# Patient Record
Sex: MALE | Race: WHITE | URBAN - METROPOLITAN AREA
[De-identification: names, ages, dates, MRNs, and addresses within clinical notes are randomized per-mention and may not be internally consistent; named-entity substitution may affect disease eponyms.]

---
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
## 2022-01-17 ENCOUNTER — OUTPATIENT (OUTPATIENT)
Dept: OUTPATIENT SERVICES | Facility: HOSPITAL | Age: 22
LOS: 1 days | End: 2022-01-17
Payer: COMMERCIAL

## 2022-01-17 ENCOUNTER — APPOINTMENT (OUTPATIENT)
Dept: RADIOLOGY | Facility: CLINIC | Age: 22
End: 2022-01-17
Payer: COMMERCIAL

## 2022-01-17 DIAGNOSIS — J45.21 MILD INTERMITTENT ASTHMA WITH (ACUTE) EXACERBATION: ICD-10-CM

## 2022-01-17 DIAGNOSIS — J34.2 DEVIATED NASAL SEPTUM: ICD-10-CM

## 2022-01-17 PROCEDURE — 71046 X-RAY EXAM CHEST 2 VIEWS: CPT

## 2022-01-17 PROCEDURE — 71046 X-RAY EXAM CHEST 2 VIEWS: CPT | Mod: 26

## 2022-01-18 ENCOUNTER — OUTPATIENT (OUTPATIENT)
Dept: OUTPATIENT SERVICES | Facility: HOSPITAL | Age: 22
LOS: 1 days | End: 2022-01-18
Payer: COMMERCIAL

## 2022-01-18 VITALS
HEIGHT: 70 IN | TEMPERATURE: 98 F | RESPIRATION RATE: 18 BRPM | WEIGHT: 250 LBS | HEART RATE: 77 BPM | OXYGEN SATURATION: 100 % | DIASTOLIC BLOOD PRESSURE: 67 MMHG | SYSTOLIC BLOOD PRESSURE: 129 MMHG

## 2022-01-18 DIAGNOSIS — Z01.818 ENCOUNTER FOR OTHER PREPROCEDURAL EXAMINATION: ICD-10-CM

## 2022-01-18 DIAGNOSIS — S09.92XA UNSPECIFIED INJURY OF NOSE, INITIAL ENCOUNTER: ICD-10-CM

## 2022-01-18 LAB
ANION GAP SERPL CALC-SCNC: 4 MMOL/L — LOW (ref 5–17)
BASOPHILS # BLD AUTO: 0.04 K/UL — SIGNIFICANT CHANGE UP (ref 0–0.2)
BASOPHILS NFR BLD AUTO: 0.6 % — SIGNIFICANT CHANGE UP (ref 0–2)
BUN SERPL-MCNC: 16 MG/DL — SIGNIFICANT CHANGE UP (ref 7–23)
CALCIUM SERPL-MCNC: 9.1 MG/DL — SIGNIFICANT CHANGE UP (ref 8.5–10.1)
CHLORIDE SERPL-SCNC: 108 MMOL/L — SIGNIFICANT CHANGE UP (ref 96–108)
CO2 SERPL-SCNC: 26 MMOL/L — SIGNIFICANT CHANGE UP (ref 22–31)
CREAT SERPL-MCNC: 0.79 MG/DL — SIGNIFICANT CHANGE UP (ref 0.5–1.3)
EOSINOPHIL # BLD AUTO: 0.27 K/UL — SIGNIFICANT CHANGE UP (ref 0–0.5)
EOSINOPHIL NFR BLD AUTO: 4 % — SIGNIFICANT CHANGE UP (ref 0–6)
GLUCOSE SERPL-MCNC: 86 MG/DL — SIGNIFICANT CHANGE UP (ref 70–99)
HCT VFR BLD CALC: 43.2 % — SIGNIFICANT CHANGE UP (ref 39–50)
HGB BLD-MCNC: 15 G/DL — SIGNIFICANT CHANGE UP (ref 13–17)
IMM GRANULOCYTES NFR BLD AUTO: 0.3 % — SIGNIFICANT CHANGE UP (ref 0–1.5)
LYMPHOCYTES # BLD AUTO: 2.16 K/UL — SIGNIFICANT CHANGE UP (ref 1–3.3)
LYMPHOCYTES # BLD AUTO: 31.6 % — SIGNIFICANT CHANGE UP (ref 13–44)
MCHC RBC-ENTMCNC: 28.8 PG — SIGNIFICANT CHANGE UP (ref 27–34)
MCHC RBC-ENTMCNC: 34.7 GM/DL — SIGNIFICANT CHANGE UP (ref 32–36)
MCV RBC AUTO: 82.9 FL — SIGNIFICANT CHANGE UP (ref 80–100)
MONOCYTES # BLD AUTO: 0.63 K/UL — SIGNIFICANT CHANGE UP (ref 0–0.9)
MONOCYTES NFR BLD AUTO: 9.2 % — SIGNIFICANT CHANGE UP (ref 2–14)
NEUTROPHILS # BLD AUTO: 3.71 K/UL — SIGNIFICANT CHANGE UP (ref 1.8–7.4)
NEUTROPHILS NFR BLD AUTO: 54.3 % — SIGNIFICANT CHANGE UP (ref 43–77)
PLATELET # BLD AUTO: 324 K/UL — SIGNIFICANT CHANGE UP (ref 150–400)
POTASSIUM SERPL-MCNC: 4 MMOL/L — SIGNIFICANT CHANGE UP (ref 3.5–5.3)
POTASSIUM SERPL-SCNC: 4 MMOL/L — SIGNIFICANT CHANGE UP (ref 3.5–5.3)
RBC # BLD: 5.21 M/UL — SIGNIFICANT CHANGE UP (ref 4.2–5.8)
RBC # FLD: 12.3 % — SIGNIFICANT CHANGE UP (ref 10.3–14.5)
SODIUM SERPL-SCNC: 138 MMOL/L — SIGNIFICANT CHANGE UP (ref 135–145)
WBC # BLD: 6.83 K/UL — SIGNIFICANT CHANGE UP (ref 3.8–10.5)
WBC # FLD AUTO: 6.83 K/UL — SIGNIFICANT CHANGE UP (ref 3.8–10.5)

## 2022-01-18 PROCEDURE — 36415 COLL VENOUS BLD VENIPUNCTURE: CPT

## 2022-01-18 PROCEDURE — 80048 BASIC METABOLIC PNL TOTAL CA: CPT

## 2022-01-18 PROCEDURE — 85025 COMPLETE CBC W/AUTO DIFF WBC: CPT

## 2022-01-18 PROCEDURE — G0463: CPT | Mod: 25

## 2022-01-18 RX ORDER — SODIUM CHLORIDE 9 MG/ML
1000 INJECTION, SOLUTION INTRAVENOUS
Refills: 0 | Status: DISCONTINUED | OUTPATIENT
Start: 2022-01-19 | End: 2022-01-19

## 2022-01-18 RX ORDER — FENTANYL CITRATE 50 UG/ML
50 INJECTION INTRAVENOUS
Refills: 0 | Status: DISCONTINUED | OUTPATIENT
Start: 2022-01-19 | End: 2022-01-19

## 2022-01-18 RX ORDER — ONDANSETRON 8 MG/1
4 TABLET, FILM COATED ORAL EVERY 6 HOURS
Refills: 0 | Status: DISCONTINUED | OUTPATIENT
Start: 2022-01-19 | End: 2022-01-19

## 2022-01-18 NOTE — H&P PST ADULT - HISTORY OF PRESENT ILLNESS
22 y/o male with  22 y/o male with complain of nasal congestion. Pt reports old injury to his nose, "I fell and broke my nose last year." He is here for PST for planned Nasal septoplasty.

## 2022-01-18 NOTE — H&P PST ADULT - ASSESSMENT
Plan  1. Stop all NSAIDS, herbal supplements and vitamins for 7 days.  2. NPO  as per ASU instructions.  3. CIOVID test done 01/16/2022 in [facility]   4. Labs as per surgeon.t  5. PMD visit for optimization prior to surgery as per surgeon         20 y/o male with complain of nasal congestion. Pt reports old injury to his nose, "I fell and broke my nose last year." He is scheduled for Nasal septoplasty.   Plan  1. Stop all NSAIDS, herbal supplements and vitamins for 7 days.  2. NPO  as per ASU instructions.  3. COVID test done 01/16/2022 in [facility]   4. Labs as per surgeon  5. PMD visit for optimization prior to surgery as per surgeon

## 2022-01-18 NOTE — ASU PATIENT PROFILE, ADULT - NSICDXPASTMEDICALHX_GEN_ALL_CORE_FT
PAST MEDICAL HISTORY:  Anxiety and depression     Asthma as a child    At risk for sleep apnea     History of nose injury     Obesity     Post traumatic stress disorder (PTSD) childhood

## 2022-01-18 NOTE — H&P PST ADULT - NSICDXPASTMEDICALHX_GEN_ALL_CORE_FT
PAST MEDICAL HISTORY:  Anxiety and depression     Asthma as a child    At risk for sleep apnea     Nasal bone fracture     Obesity     Post traumatic stress disorder (PTSD) childhood     PAST MEDICAL HISTORY:  Anxiety and depression     Asthma as a child    At risk for sleep apnea     History of nose injury     Obesity     Post traumatic stress disorder (PTSD) childhood

## 2022-01-18 NOTE — ASU PATIENT PROFILE, ADULT - FALL HARM RISK - UNIVERSAL INTERVENTIONS
Bed in lowest position, wheels locked, appropriate side rails in place/Call bell, personal items and telephone in reach/Instruct patient to call for assistance before getting out of bed or chair/Non-slip footwear when patient is out of bed/[place] to call system/Physically safe environment - no spills, clutter or unnecessary equipment/Purposeful Proactive Rounding/Room/bathroom lighting operational, light cord in reach

## 2022-01-18 NOTE — ASU PATIENT PROFILE, ADULT - NS PRO ABUSE SCREEN AFRAID ANYONE YN
-- DO NOT REPLY / DO NOT REPLY ALL --  -- Message is from the [facility]--    Patient is requesting a medication refill - medication is on active medication list    Patient is currently OUT of the requested medication.    Was Medication Pended?  No.     Rx Name and Dose:   atorvastatin (LIPITOR) 80 MG tablet                                        metoPROLOL succinate (TOPROL-XL) 25 MG 24 hr tablet    Duration: 90 days    Pharmacy  [id number] [place] [address] [facility]    Patient confirmed the above pharmacy as correct?  Yes    Caller Information       Type Contact Phone    07/20/2020 04:13 PM Phone (Incoming) Bessy Aragon (Emergency Contact) [phone]          Alternative phone number:      Turnaround time given to caller:   \"This message will be sent to [state Provider's name]. The clinical team will fulfill your request as soon as they review your message.\"   no

## 2022-01-18 NOTE — H&P PST ADULT - NSICDXFAMILYHX_GEN_ALL_CORE_FT
FAMILY HISTORY:  Father  Still living? Unknown  Family history of prostate cancer in father, Age at diagnosis: Age Unknown    Sibling  Still living? Unknown  Family history of liver disease, Age at diagnosis: Age Unknown

## 2022-01-19 ENCOUNTER — OUTPATIENT (OUTPATIENT)
Dept: INPATIENT UNIT | Facility: HOSPITAL | Age: 22
LOS: 1 days | Discharge: HOME | End: 2022-01-19
Payer: COMMERCIAL

## 2022-01-19 VITALS
TEMPERATURE: 97 F | HEART RATE: 88 BPM | DIASTOLIC BLOOD PRESSURE: 81 MMHG | SYSTOLIC BLOOD PRESSURE: 138 MMHG | OXYGEN SATURATION: 97 % | RESPIRATION RATE: 18 BRPM

## 2022-01-19 VITALS
HEIGHT: 70 IN | WEIGHT: 250 LBS | HEART RATE: 95 BPM | OXYGEN SATURATION: 97 % | RESPIRATION RATE: 16 BRPM | TEMPERATURE: 98 F | DIASTOLIC BLOOD PRESSURE: 71 MMHG | SYSTOLIC BLOOD PRESSURE: 130 MMHG

## 2022-01-19 DIAGNOSIS — Z01.818 ENCOUNTER FOR OTHER PREPROCEDURAL EXAMINATION: ICD-10-CM

## 2022-01-19 DIAGNOSIS — S09.92XA UNSPECIFIED INJURY OF NOSE, INITIAL ENCOUNTER: ICD-10-CM

## 2022-01-19 PROCEDURE — 30465 REPAIR NASAL STENOSIS: CPT | Mod: AS

## 2022-01-19 RX ORDER — ACETAMINOPHEN 500 MG
1000 TABLET ORAL ONCE
Refills: 0 | Status: COMPLETED | OUTPATIENT
Start: 2022-01-19 | End: 2022-01-19

## 2022-01-19 RX ORDER — OXYCODONE HYDROCHLORIDE 5 MG/1
5 TABLET ORAL ONCE
Refills: 0 | Status: DISCONTINUED | OUTPATIENT
Start: 2022-01-19 | End: 2022-01-19

## 2022-01-19 RX ADMIN — FENTANYL CITRATE 50 MICROGRAM(S): 50 INJECTION INTRAVENOUS at 11:20

## 2022-01-19 RX ADMIN — OXYCODONE HYDROCHLORIDE 5 MILLIGRAM(S): 5 TABLET ORAL at 12:02

## 2022-01-19 RX ADMIN — Medication 400 MILLIGRAM(S): at 11:20

## 2022-01-19 NOTE — ASU DISCHARGE PLAN (ADULT/PEDIATRIC) - CARE PROVIDER_API CALL
Reynold Brown (MD)  Plastic Surgery; Surgery of the Hand  [address], [address]  [place] [address]  Phone: [phone]  Fax: [phone]  Follow Up Time:

## 2022-01-19 NOTE — ASU DISCHARGE PLAN (ADULT/PEDIATRIC) - ASU DC SPECIAL INSTRUCTIONSFT
Please take pain medications as already prescribed by Dr. Brown.  Leave steri strips and splint in place until seen in the office.

## 2022-01-19 NOTE — ASU DISCHARGE PLAN (ADULT/PEDIATRIC) - NS MD DC FALL RISK RISK
For information on Fall & Injury Prevention, visit: [email or web address] OR  [email or web address] OR  https://www.cdc.gov/steadi/patient.html

## 2022-01-24 DIAGNOSIS — Z87.891 PERSONAL HISTORY OF NICOTINE DEPENDENCE: ICD-10-CM

## 2022-01-24 DIAGNOSIS — J34.89 OTHER SPECIFIED DISORDERS OF NOSE AND NASAL SINUSES: ICD-10-CM

## 2022-01-24 DIAGNOSIS — E66.9 OBESITY, UNSPECIFIED: ICD-10-CM

## 2022-01-24 DIAGNOSIS — F43.10 POST-TRAUMATIC STRESS DISORDER, UNSPECIFIED: ICD-10-CM

## 2022-01-24 DIAGNOSIS — Z80.42 FAMILY HISTORY OF MALIGNANT NEOPLASM OF PROSTATE: ICD-10-CM

## 2022-01-24 DIAGNOSIS — J34.2 DEVIATED NASAL SEPTUM: ICD-10-CM

## 2022-01-24 DIAGNOSIS — J34.3 HYPERTROPHY OF NASAL TURBINATES: ICD-10-CM

## 2022-01-24 DIAGNOSIS — Z87.828 PERSONAL HISTORY OF OTHER (HEALED) PHYSICAL INJURY AND TRAUMA: ICD-10-CM

## 2022-07-14 PROBLEM — Z00.00 ENCOUNTER FOR PREVENTIVE HEALTH EXAMINATION: Status: ACTIVE | Noted: 2022-07-14

## 2023-11-15 ENCOUNTER — OFFICE (OUTPATIENT)
Dept: URBAN - METROPOLITAN AREA CLINIC 113 | Facility: CLINIC | Age: 23
Setting detail: OPHTHALMOLOGY
End: 2023-11-15
Payer: COMMERCIAL

## 2023-11-15 DIAGNOSIS — H52.13: ICD-10-CM

## 2023-11-15 PROCEDURE — SCREF LASIK EVAL: Performed by: OPHTHALMOLOGY

## 2023-11-15 ASSESSMENT — REFRACTION_MANIFEST
OD_VA1: 20/20
OD_AXIS: 020
OD_VA1: 20/20
OD_VA1: 20/20
OD_CYLINDER: -3.75
OS_AXIS: 160
OS_CYLINDER: -3.25
OS_AXIS: 160
OD_SPHERE: -0.50
OS_CYLINDER: -2.50
OS_CYLINDER: -3.50
OS_SPHERE: PLANO
OS_SPHERE: +0.25
OS_AXIS: 160
OD_AXIS: 025
OD_CYLINDER: -3.50
OS_VA1: 20/20
OS_SPHERE: +0.25
OD_CYLINDER: -3.25
OD_AXIS: 025
OD_SPHERE: -0.50
OD_SPHERE: -0.25
OS_VA1: 20/20
OS_VA1: 20/20

## 2023-11-15 ASSESSMENT — REFRACTION_AUTOREFRACTION
OD_AXIS: 024
OS_AXIS: 163
OS_CYLINDER: -3.50
OS_SPHERE: +0.25
OD_CYLINDER: -3.50
OD_SPHERE: -0.75

## 2023-11-15 ASSESSMENT — LID EXAM ASSESSMENTS
OS_BLEPHARITIS: LUL T
OD_BLEPHARITIS: RUL T

## 2023-11-15 ASSESSMENT — REFRACTION_CURRENTRX
OS_AXIS: 159
OS_OVR_VA: 20/
OD_CYLINDER: -3.25
OS_CYLINDER: -2.50
OD_AXIS: 021
OS_VPRISM_DIRECTION: SV
OS_SPHERE: +0.25
OD_OVR_VA: 20/
OD_VPRISM_DIRECTION: SV
OD_SPHERE: -0.50

## 2023-11-15 ASSESSMENT — SPHEQUIV_DERIVED
OD_SPHEQUIV: -2.125
OD_SPHEQUIV: -2.5
OD_SPHEQUIV: -2.125
OS_SPHEQUIV: -1
OS_SPHEQUIV: -1.5
OD_SPHEQUIV: -2.25
OS_SPHEQUIV: -1.375

## 2023-11-15 ASSESSMENT — DECREASING TEAR LAKE - SEVERITY SCORE
OD_DEC_TEARLAKE: T
OS_DEC_TEARLAKE: T

## 2023-11-15 ASSESSMENT — CONFRONTATIONAL VISUAL FIELD TEST (CVF)
OD_FINDINGS: FULL
OS_FINDINGS: FULL

## 2023-12-01 ENCOUNTER — OFFICE (OUTPATIENT)
Dept: URBAN - METROPOLITAN AREA CLINIC 94 | Facility: CLINIC | Age: 23
Setting detail: OPHTHALMOLOGY
End: 2023-12-01
Payer: COMMERCIAL

## 2023-12-01 DIAGNOSIS — H17.823: ICD-10-CM

## 2023-12-01 PROCEDURE — 99024 POSTOP FOLLOW-UP VISIT: CPT | Performed by: PHYSICIAN ASSISTANT

## 2023-12-01 ASSESSMENT — REFRACTION_CURRENTRX
OS_SPHERE: +0.25
OD_VPRISM_DIRECTION: SV
OD_OVR_VA: 20/
OS_VPRISM_DIRECTION: SV
OS_AXIS: 159
OD_AXIS: 021
OS_CYLINDER: -2.50
OD_SPHERE: -0.50
OS_OVR_VA: 20/
OD_CYLINDER: -3.25

## 2023-12-01 ASSESSMENT — REFRACTION_AUTOREFRACTION
OD_CYLINDER: -0.25
OD_SPHERE: 0.00
OS_SPHERE: +0.25
OS_CYLINDER: -0.25
OS_AXIS: 120
OD_AXIS: 114

## 2023-12-01 ASSESSMENT — REFRACTION_MANIFEST
OD_CYLINDER: -3.75
OD_AXIS: 025
OS_AXIS: 160
OS_VA1: 20/20
OS_SPHERE: PLANO
OS_CYLINDER: -3.50
OS_VA1: 20/20
OD_VA1: 20/20
OS_AXIS: 160
OS_CYLINDER: -3.25
OS_SPHERE: +0.25
OS_CYLINDER: -2.50
OD_CYLINDER: -3.25
OS_VA1: 20/20
OS_AXIS: 160
OS_SPHERE: +0.25
OD_VA1: 20/20
OD_AXIS: 025
OD_VA1: 20/20
OD_SPHERE: -0.50
OD_SPHERE: -0.50
OD_CYLINDER: -3.50
OD_AXIS: 020
OD_SPHERE: -0.25

## 2023-12-01 ASSESSMENT — CONFRONTATIONAL VISUAL FIELD TEST (CVF)
OD_FINDINGS: FULL
OS_FINDINGS: FULL

## 2023-12-01 ASSESSMENT — DECREASING TEAR LAKE - SEVERITY SCORE
OD_DEC_TEARLAKE: T
OS_DEC_TEARLAKE: T

## 2023-12-01 ASSESSMENT — LID EXAM ASSESSMENTS
OS_BLEPHARITIS: LUL T
OD_BLEPHARITIS: RUL T

## 2023-12-01 ASSESSMENT — SPHEQUIV_DERIVED
OS_SPHEQUIV: 0.125
OS_SPHEQUIV: -1.375
OD_SPHEQUIV: -2.125
OS_SPHEQUIV: -1
OD_SPHEQUIV: -2.125
OD_SPHEQUIV: -2.25
OD_SPHEQUIV: -0.125

## 2023-12-06 ENCOUNTER — OFFICE (OUTPATIENT)
Dept: URBAN - METROPOLITAN AREA CLINIC 113 | Facility: CLINIC | Age: 23
Setting detail: OPHTHALMOLOGY
End: 2023-12-06
Payer: COMMERCIAL

## 2023-12-06 ENCOUNTER — RX ONLY (RX ONLY)
Age: 23
End: 2023-12-06

## 2023-12-06 DIAGNOSIS — H16.223: ICD-10-CM

## 2023-12-06 DIAGNOSIS — H17.823: ICD-10-CM

## 2023-12-06 PROCEDURE — 68761 CLOSE TEAR DUCT OPENING: CPT | Mod: 50 | Performed by: OPHTHALMOLOGY

## 2023-12-06 PROCEDURE — 92012 INTRM OPH EXAM EST PATIENT: CPT | Mod: 25 | Performed by: OPHTHALMOLOGY

## 2023-12-06 ASSESSMENT — SUPERFICIAL PUNCTATE KERATITIS (SPK)
OD_SPK: 2+
OS_SPK: 1+

## 2023-12-06 ASSESSMENT — REFRACTION_AUTOREFRACTION
OD_AXIS: 167
OD_SPHERE: PLANO
OD_CYLINDER: -0.25
OS_SPHERE: +1.25
OS_AXIS: 069
OS_CYLINDER: -0.25

## 2023-12-06 ASSESSMENT — SPHEQUIV_DERIVED
OD_SPHEQUIV: -2.125
OD_SPHEQUIV: -2.25
OS_SPHEQUIV: -1
OS_SPHEQUIV: -1.375
OD_SPHEQUIV: -2.125
OS_SPHEQUIV: 1.125

## 2023-12-06 ASSESSMENT — REFRACTION_MANIFEST
OD_AXIS: 020
OD_VA1: 20/20
OS_VA1: 20/20
OS_VA1: 20/20
OD_AXIS: 025
OS_SPHERE: PLANO
OS_VA1: 20/20
OD_VA1: 20/20
OD_VA1: 20/20
OS_SPHERE: +0.25
OS_SPHERE: +0.25
OD_SPHERE: -0.50
OS_CYLINDER: -2.50
OD_CYLINDER: -3.25
OS_AXIS: 160
OD_SPHERE: -0.50
OS_AXIS: 160
OD_CYLINDER: -3.75
OS_AXIS: 160
OD_SPHERE: -0.25
OD_CYLINDER: -3.50
OS_CYLINDER: -3.50
OD_AXIS: 025
OS_CYLINDER: -3.25

## 2023-12-06 ASSESSMENT — REFRACTION_CURRENTRX
OD_CYLINDER: -3.25
OD_AXIS: 021
OS_OVR_VA: 20/
OS_CYLINDER: -2.50
OS_VPRISM_DIRECTION: SV
OD_SPHERE: -0.50
OS_AXIS: 159
OS_SPHERE: +0.25
OD_VPRISM_DIRECTION: SV
OD_OVR_VA: 20/

## 2023-12-06 ASSESSMENT — LID EXAM ASSESSMENTS
OD_BLEPHARITIS: RUL T
OS_BLEPHARITIS: LUL T

## 2023-12-06 ASSESSMENT — CONFRONTATIONAL VISUAL FIELD TEST (CVF)
OD_FINDINGS: FULL
OS_FINDINGS: FULL

## 2023-12-06 ASSESSMENT — DECREASING TEAR LAKE - SEVERITY SCORE
OS_DEC_TEARLAKE: T
OD_DEC_TEARLAKE: T

## 2023-12-13 ENCOUNTER — OFFICE (OUTPATIENT)
Dept: URBAN - METROPOLITAN AREA CLINIC 113 | Facility: CLINIC | Age: 23
Setting detail: OPHTHALMOLOGY
End: 2023-12-13
Payer: COMMERCIAL

## 2023-12-13 DIAGNOSIS — H17.9: ICD-10-CM

## 2023-12-13 DIAGNOSIS — H16.223: ICD-10-CM

## 2023-12-13 PROCEDURE — 99024 POSTOP FOLLOW-UP VISIT: CPT | Performed by: OPTOMETRIST

## 2023-12-13 ASSESSMENT — REFRACTION_MANIFEST
OS_SPHERE: +0.25
OS_AXIS: 160
OS_VA1: 20/20
OD_VA1: 20/20
OS_AXIS: 160
OS_CYLINDER: -2.50
OS_CYLINDER: -3.25
OD_AXIS: 025
OS_AXIS: 160
OS_CYLINDER: -3.50
OS_SPHERE: +0.25
OD_VA1: 20/20
OD_AXIS: 025
OD_SPHERE: -0.25
OD_CYLINDER: -3.75
OS_SPHERE: PLANO
OS_VA1: 20/20
OD_AXIS: 020
OS_VA1: 20/20
OD_CYLINDER: -3.25
OD_VA1: 20/20
OD_CYLINDER: -3.50
OD_SPHERE: -0.50
OD_SPHERE: -0.50

## 2023-12-13 ASSESSMENT — SPHEQUIV_DERIVED
OD_SPHEQUIV: -2.125
OS_SPHEQUIV: -1.375
OD_SPHEQUIV: -0.625
OD_SPHEQUIV: -2.125
OD_SPHEQUIV: -2.25
OS_SPHEQUIV: 0.875
OS_SPHEQUIV: -1

## 2023-12-13 ASSESSMENT — REFRACTION_CURRENTRX
OS_SPHERE: +0.25
OD_AXIS: 021
OS_AXIS: 159
OD_OVR_VA: 20/
OD_CYLINDER: -3.25
OS_CYLINDER: -2.50
OD_SPHERE: -0.50
OD_VPRISM_DIRECTION: SV
OS_OVR_VA: 20/
OS_VPRISM_DIRECTION: SV

## 2023-12-13 ASSESSMENT — CONFRONTATIONAL VISUAL FIELD TEST (CVF)
OS_FINDINGS: FULL
OD_FINDINGS: FULL

## 2023-12-13 ASSESSMENT — REFRACTION_AUTOREFRACTION
OS_SPHERE: +1.00
OS_CYLINDER: -0.25
OD_SPHERE: -0.50
OD_CYLINDER: -0.25
OD_AXIS: 103
OS_AXIS: 082

## 2023-12-13 ASSESSMENT — SUPERFICIAL PUNCTATE KERATITIS (SPK)
OS_SPK: T
OD_SPK: 1+

## 2023-12-13 ASSESSMENT — LID EXAM ASSESSMENTS
OD_BLEPHARITIS: RUL T
OS_BLEPHARITIS: LUL T

## 2023-12-13 ASSESSMENT — DECREASING TEAR LAKE - SEVERITY SCORE
OS_DEC_TEARLAKE: T
OD_DEC_TEARLAKE: T

## 2023-12-26 PROBLEM — H11.152 PINGUECULA ; LEFT EYE: Status: ACTIVE | Noted: 2023-12-26

## 2024-01-10 ENCOUNTER — OFFICE (OUTPATIENT)
Dept: URBAN - METROPOLITAN AREA CLINIC 113 | Facility: CLINIC | Age: 24
Setting detail: OPHTHALMOLOGY
End: 2024-01-10
Payer: COMMERCIAL

## 2024-01-10 DIAGNOSIS — H16.223: ICD-10-CM

## 2024-01-10 DIAGNOSIS — H17.9: ICD-10-CM

## 2024-01-10 PROCEDURE — 99024 POSTOP FOLLOW-UP VISIT: CPT | Performed by: OPTOMETRIST

## 2024-01-10 ASSESSMENT — REFRACTION_MANIFEST
OD_SPHERE: -0.50
OD_VA1: 20/20
OS_CYLINDER: -3.25
OS_SPHERE: +0.25
OD_VA1: 20/20
OS_CYLINDER: -3.50
OD_CYLINDER: SPH
OD_SPHERE: PLANO
OS_VA1: 20/20
OS_CYLINDER: SPH
OD_VA1: 20/20
OS_AXIS: 160
OD_CYLINDER: -3.50
OS_SPHERE: +0.25
OD_CYLINDER: -3.75
OS_VA1: 20/20
OS_AXIS: 160
OD_AXIS: 025
OS_VA1: 20/20
OD_SPHERE: -0.25
OD_AXIS: 025
OS_SPHERE: PLANO

## 2024-01-10 ASSESSMENT — REFRACTION_CURRENTRX
OS_SPHERE: +0.25
OD_VPRISM_DIRECTION: SV
OD_AXIS: 021
OD_CYLINDER: -3.25
OS_OVR_VA: 20/
OS_AXIS: 159
OS_VPRISM_DIRECTION: SV
OD_OVR_VA: 20/
OD_SPHERE: -0.50
OS_CYLINDER: -2.50

## 2024-01-10 ASSESSMENT — REFRACTION_AUTOREFRACTION
OD_CYLINDER: SPHERE
OS_SPHERE: +1.00
OS_AXIS: 000
OS_CYLINDER: SPHERE
OD_AXIS: 000
OD_SPHERE: PLANO

## 2024-01-10 ASSESSMENT — CONFRONTATIONAL VISUAL FIELD TEST (CVF)
OD_FINDINGS: FULL
OS_FINDINGS: FULL

## 2024-01-10 ASSESSMENT — SPHEQUIV_DERIVED
OD_SPHEQUIV: -2.25
OD_SPHEQUIV: -2.125
OS_SPHEQUIV: -1.375

## 2024-01-10 ASSESSMENT — SUPERFICIAL PUNCTATE KERATITIS (SPK)
OS_SPK: ABSENT
OD_SPK: ABSENT

## 2024-03-09 ENCOUNTER — OFFICE (OUTPATIENT)
Dept: URBAN - METROPOLITAN AREA CLINIC 113 | Facility: CLINIC | Age: 24
Setting detail: OPHTHALMOLOGY
End: 2024-03-09
Payer: COMMERCIAL

## 2024-03-09 DIAGNOSIS — H17.9: ICD-10-CM

## 2024-03-09 PROCEDURE — 99024 POSTOP FOLLOW-UP VISIT: CPT | Performed by: OPTOMETRIST

## 2024-03-09 ASSESSMENT — SPHEQUIV_DERIVED
OD_SPHEQUIV: -2.25
OD_SPHEQUIV: -2.125
OS_SPHEQUIV: -1.375

## 2024-03-09 ASSESSMENT — REFRACTION_MANIFEST
OS_AXIS: 160
OD_AXIS: 025
OD_SPHERE: -0.25
OD_VA1: 20/20
OD_AXIS: 025
OS_CYLINDER: -3.25
OS_SPHERE: PLANO
OD_SPHERE: -0.50
OS_VA1: 20/20
OD_VA1: 20/20
OD_SPHERE: PLANO
OS_SPHERE: +0.25
OS_AXIS: 160
OS_VA1: 20/20
OS_SPHERE: +1.00
OD_CYLINDER: -3.50
OD_VA1: 20/20
OD_CYLINDER: SPH
OS_CYLINDER: SPH
OS_CYLINDER: -3.50
OS_VA1: 20/20
OD_CYLINDER: -3.75

## 2024-03-09 ASSESSMENT — REFRACTION_CURRENTRX
OD_SPHERE: -0.50
OS_OVR_VA: 20/
OD_OVR_VA: 20/
OS_VPRISM_DIRECTION: SV
OD_CYLINDER: -3.25
OD_VPRISM_DIRECTION: SV
OD_AXIS: 021
OS_CYLINDER: -2.50
OS_SPHERE: +0.25
OS_AXIS: 159

## 2024-05-13 PROBLEM — H35.033 HYPERTENSIVE RETINOPATHY; BOTH EYES: Status: ACTIVE | Noted: 2024-05-06

## 2024-06-20 ENCOUNTER — OFFICE (OUTPATIENT)
Dept: URBAN - METROPOLITAN AREA CLINIC 113 | Facility: CLINIC | Age: 24
Setting detail: OPHTHALMOLOGY
End: 2024-06-20
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
## 2024-06-20 PROCEDURE — NO SHOW FE NO SHOW FEE: Performed by: OPTOMETRIST

## 2025-01-08 PROBLEM — J45.909 UNSPECIFIED ASTHMA, UNCOMPLICATED: Chronic | Status: ACTIVE | Noted: 2022-01-18

## 2025-01-08 PROBLEM — F41.9 ANXIETY DISORDER, UNSPECIFIED: Chronic | Status: ACTIVE | Noted: 2022-01-18

## 2025-01-08 PROBLEM — Z87.828 PERSONAL HISTORY OF OTHER (HEALED) PHYSICAL INJURY AND TRAUMA: Chronic | Status: ACTIVE | Noted: 2022-01-18

## 2025-01-08 PROBLEM — E66.9 OBESITY, UNSPECIFIED: Chronic | Status: ACTIVE | Noted: 2022-01-18

## 2025-01-08 PROBLEM — F43.10 POST-TRAUMATIC STRESS DISORDER, UNSPECIFIED: Chronic | Status: ACTIVE | Noted: 2022-01-18

## 2025-01-08 PROBLEM — Z91.89 OTHER SPECIFIED PERSONAL RISK FACTORS, NOT ELSEWHERE CLASSIFIED: Chronic | Status: ACTIVE | Noted: 2022-01-18

## 2025-01-13 ENCOUNTER — APPOINTMENT (OUTPATIENT)
Dept: PULMONOLOGY | Facility: CLINIC | Age: 25
End: 2025-01-13
Payer: COMMERCIAL

## 2025-01-13 VITALS
DIASTOLIC BLOOD PRESSURE: 68 MMHG | OXYGEN SATURATION: 98 % | SYSTOLIC BLOOD PRESSURE: 122 MMHG | BODY MASS INDEX: 33.93 KG/M2 | RESPIRATION RATE: 16 BRPM | HEIGHT: 70.25 IN | HEART RATE: 87 BPM | WEIGHT: 237 LBS

## 2025-01-13 DIAGNOSIS — Z78.9 OTHER SPECIFIED HEALTH STATUS: ICD-10-CM

## 2025-01-13 DIAGNOSIS — E66.9 OBESITY, UNSPECIFIED: ICD-10-CM

## 2025-01-13 DIAGNOSIS — G47.33 OBSTRUCTIVE SLEEP APNEA (ADULT) (PEDIATRIC): ICD-10-CM

## 2025-01-13 PROCEDURE — G2211 COMPLEX E/M VISIT ADD ON: CPT

## 2025-01-13 PROCEDURE — 99204 OFFICE O/P NEW MOD 45 MIN: CPT

## 2025-01-23 ENCOUNTER — OUTPATIENT (OUTPATIENT)
Dept: OUTPATIENT SERVICES | Facility: HOSPITAL | Age: 25
LOS: 1 days | End: 2025-01-23

## 2025-01-23 DIAGNOSIS — G47.33 OBSTRUCTIVE SLEEP APNEA (ADULT) (PEDIATRIC): ICD-10-CM

## 2025-01-31 ENCOUNTER — OUTPATIENT (OUTPATIENT)
Dept: OUTPATIENT SERVICES | Facility: HOSPITAL | Age: 25
LOS: 1 days | End: 2025-01-31
Payer: COMMERCIAL

## 2025-01-31 DIAGNOSIS — G47.33 OBSTRUCTIVE SLEEP APNEA (ADULT) (PEDIATRIC): ICD-10-CM

## 2025-01-31 PROCEDURE — 95800 SLP STDY UNATTENDED: CPT | Mod: 26

## 2025-01-31 PROCEDURE — 95800 SLP STDY UNATTENDED: CPT

## 2025-02-04 ENCOUNTER — APPOINTMENT (OUTPATIENT)
Dept: PULMONOLOGY | Facility: CLINIC | Age: 25
End: 2025-02-04
Payer: COMMERCIAL

## 2025-02-04 DIAGNOSIS — E66.9 OBESITY, UNSPECIFIED: ICD-10-CM

## 2025-02-04 DIAGNOSIS — G47.33 OBSTRUCTIVE SLEEP APNEA (ADULT) (PEDIATRIC): ICD-10-CM

## 2025-02-04 PROCEDURE — G2211 COMPLEX E/M VISIT ADD ON: CPT | Mod: 95

## 2025-02-04 PROCEDURE — 99214 OFFICE O/P EST MOD 30 MIN: CPT | Mod: 95

## 2025-02-08 ENCOUNTER — TRANSCRIPTION ENCOUNTER (OUTPATIENT)
Age: 25
End: 2025-02-08

## 2025-02-20 ENCOUNTER — APPOINTMENT (OUTPATIENT)
Dept: PULMONOLOGY | Facility: CLINIC | Age: 25
End: 2025-02-20

## 2025-05-21 ENCOUNTER — APPOINTMENT (OUTPATIENT)
Dept: PULMONOLOGY | Facility: CLINIC | Age: 25
End: 2025-05-21
Payer: COMMERCIAL

## 2025-05-21 VITALS
HEART RATE: 87 BPM | DIASTOLIC BLOOD PRESSURE: 74 MMHG | OXYGEN SATURATION: 97 % | RESPIRATION RATE: 16 BRPM | SYSTOLIC BLOOD PRESSURE: 110 MMHG

## 2025-05-21 VITALS — HEIGHT: 70.25 IN | BODY MASS INDEX: 35.79 KG/M2 | WEIGHT: 250 LBS

## 2025-05-21 DIAGNOSIS — E66.9 OBESITY, UNSPECIFIED: ICD-10-CM

## 2025-05-21 DIAGNOSIS — Z78.9 OTHER SPECIFIED HEALTH STATUS: ICD-10-CM

## 2025-05-21 DIAGNOSIS — G47.33 OBSTRUCTIVE SLEEP APNEA (ADULT) (PEDIATRIC): ICD-10-CM

## 2025-05-21 PROCEDURE — G2211 COMPLEX E/M VISIT ADD ON: CPT

## 2025-05-21 PROCEDURE — 99214 OFFICE O/P EST MOD 30 MIN: CPT

## 2025-07-17 ENCOUNTER — APPOINTMENT (OUTPATIENT)
Dept: PULMONOLOGY | Facility: CLINIC | Age: 25
End: 2025-07-17